# Patient Record
Sex: MALE | Race: WHITE | NOT HISPANIC OR LATINO | Employment: UNEMPLOYED | URBAN - METROPOLITAN AREA
[De-identification: names, ages, dates, MRNs, and addresses within clinical notes are randomized per-mention and may not be internally consistent; named-entity substitution may affect disease eponyms.]

---

## 2018-05-22 ENCOUNTER — HOSPITAL ENCOUNTER (EMERGENCY)
Facility: MEDICAL CENTER | Age: 63
End: 2018-05-23
Attending: EMERGENCY MEDICINE
Payer: MEDICAID

## 2018-05-22 DIAGNOSIS — R45.851 SUICIDAL IDEATION: ICD-10-CM

## 2018-05-22 DIAGNOSIS — F32.A DEPRESSION, UNSPECIFIED DEPRESSION TYPE: ICD-10-CM

## 2018-05-22 DIAGNOSIS — F15.10 METHAMPHETAMINE ABUSE (HCC): ICD-10-CM

## 2018-05-22 LAB
AMPHET UR QL SCN: POSITIVE
BARBITURATES UR QL SCN: NEGATIVE
BENZODIAZ UR QL SCN: NEGATIVE
BZE UR QL SCN: NEGATIVE
CANNABINOIDS UR QL SCN: NEGATIVE
METHADONE UR QL SCN: NEGATIVE
OPIATES UR QL SCN: NEGATIVE
OXYCODONE UR QL SCN: NEGATIVE
PCP UR QL SCN: NEGATIVE
POC BREATHALIZER: 0 PERCENT (ref 0–0.01)
PROPOXYPH UR QL SCN: NEGATIVE

## 2018-05-22 PROCEDURE — 99284 EMERGENCY DEPT VISIT MOD MDM: CPT

## 2018-05-22 PROCEDURE — 90791 PSYCH DIAGNOSTIC EVALUATION: CPT

## 2018-05-22 PROCEDURE — 302970 POC BREATHALIZER: Performed by: EMERGENCY MEDICINE

## 2018-05-22 PROCEDURE — 80307 DRUG TEST PRSMV CHEM ANLYZR: CPT

## 2018-05-22 NOTE — ED PROVIDER NOTES
"ED Provider Note    Scribed for Andrea Miranda M.D. by Delores Duque. 5/22/2018, 3:44 AM.    Primary care provider: No primary care provider on file.  Means of arrival: Walk-in  History obtained from: Patient  History limited by: None    CHIEF COMPLAINT  Chief Complaint   Patient presents with   • Suicidal Ideation       HPI  Gokul Cantor is a 62 y.o. male who presents to the Emergency Department with suicidal ideation. The patient states tonight he did \"a bunch of coke and meth and tried to overdose because I am tired of not having a job and being homeless\". Gokul states he mixed coke and meth and then injected it. He confirms one year ago he attempted to overdose on Heroin. The patient states he has been feeling this way for a while. Reports he doesn't use drugs daily. He has been to the [place] [facility] facility twice. The patient moved to [place] three months ago. Gokul states he has never been to rehab. He is here seeking for counseling. Patient denies alcohol use.     REVIEW OF SYSTEMS  See HPI for further details. E.     PAST MEDICAL HISTORY   has a past medical history of Suicidal ideation.    SURGICAL HISTORY  patient denies any surgical history    SOCIAL HISTORY  Social History   Substance Use Topics   • Smoking status: Current Every Day Smoker     Packs/day: 0.50     Types: Cigarettes   • Smokeless tobacco: Never Used   • Alcohol use No      History   Drug use: Unknown     Comment: Meth IV and coke IV last use was 5/21/18       FAMILY HISTORY  History reviewed. No pertinent family history.    CURRENT MEDICATIONS  Reviewed.  See Encounter Summary.     ALLERGIES  Allergies   Allergen Reactions   • Codeine Nausea   • Pcn [Penicillins] Hives       PHYSICAL EXAM  VITAL SIGNS: /88   Pulse 71   Temp 36.5 °C (97.7 °F)   Resp 18   Ht 1.753 m (5' 9\")   Wt 61.4 kg (135 lb 5.8 oz)   SpO2 98%   BMI 19.99 kg/m²   Constitutional: Alert in no apparent distress.  HENT: No signs of trauma, Bilateral " external ears normal, Nose normal.   Eyes: Pupils are equal and reactive, Conjunctiva normal, Non-icteric.   Neck: Normal range of motion, No tenderness, Supple, No stridor.   Cardiovascular: Regular rate and rhythm, no murmurs.   Thorax & Lungs: Normal breath sounds, No respiratory distress, No wheezing, No chest tenderness.   Abdomen: Bowel sounds normal, Soft, No tenderness, No masses, No pulsatile masses. No peritoneal signs.  Skin: Warm, Dry, No erythema, No rash.   Back: No bony tenderness, No CVA tenderness.   Extremities: Intact distal pulses, No edema, No tenderness, No cyanosis  Musculoskeletal: Good range of motion in all major joints. No tenderness to palpation or major deformities noted.   Neurologic: Alert , Normal motor function, Normal sensory function, No focal deficits noted.   Psychiatric: Affect normal, Judgment normal, Mood normal.     DIAGNOSTIC STUDIES / PROCEDURES     LABS  Results for orders placed or performed during the hospital encounter of 05/22/18   URINE DRUG SCREEN (TRIAGE)   Result Value Ref Range    Amphetamines Urine Positive (A) Negative    Barbiturates Negative Negative    Benzodiazepines Negative Negative    Cocaine Metabolite Negative Negative    Methadone Negative Negative    Opiates Negative Negative    Oxycodone Negative Negative    Phencyclidine -Pcp Negative Negative    Propoxyphene Negative Negative    Cannabinoid Metab Negative Negative   POC BREATHALIZER   Result Value Ref Range    POC Breathalizer 0.001 0.00 - 0.01 Percent     All labs were reviewed by me.    COURSE & MEDICAL DECISION MAKING  Pertinent Labs & Imaging studies reviewed. (See chart for details)      3:44 AM - Patient seen and examined at bedside. Ordered POC Breathalizer and Urine Drug Screen to evaluate his symptoms. I discussed with the patient about a life skills consult for further evaluation.     Decision Making:  This is a 62 y.o. year old male who presents with admitting to meth and cocaine use  "last night with \"intent to hurt myself\". Patient reports long-standing history of depression and prior suicidal ideations. Last approximately 1 year ago with the same. Patient recently relocated from [facility] to [facility]. No other medical records to the same here on chart review attempts. At this time I have up held the legal hold and will have the patient For ongoing psychiatric care and possible inpatient transfer.      FINAL IMPRESSION  1. Methamphetamine abuse    2. Depression, unspecified depression type    3. Suicidal ideation          Delores MCDERMOTT (Roberto), am scribing for, and in the presence of, Andrea Miranda M.D..    Electronically signed by: Delores Duque (Scribe), 5/22/2018    IAndrea M.D. personally performed the services described in this documentation, as scribed by Delores Duque in my presence, and it is both accurate and complete.    The note accurately reflects work and decisions made by me.  Andrea Miranda  5/22/2018  8:39 AM            "

## 2018-05-22 NOTE — ED NOTES
Med rec updated and complete  Allergies reviewed  Pt reports no prescription medications, OTC's, or vitamins.  Pt reports no antibiotics in the last 30 days.  Pt is not sure what pharmacy to go too.

## 2018-05-22 NOTE — ED NOTES
Pt resting comfortably w/ visible rise and fall of chest; even, unlabored respirations observed. Pt in view of sitter and nurse's station. Pt awaiting Life Skills evaluation.

## 2018-05-22 NOTE — ED NOTES
Pt had been sleeping in the ED lobby for the past two hours. Pt hadn't called for a ride and when pt was asked to leave by security, Pt decided to check in to the ED.

## 2018-05-22 NOTE — ED NOTES
Urine collected and sent; breathalyzer .001 but pt states he has used cocaine and meth. Pt calm and cooperative at this time. Charge RN aware of pt. Belongings placed in one bag, searched by security, placed in locker 8.

## 2018-05-22 NOTE — ED TRIAGE NOTES
"Chief Complaint   Patient presents with   • Suicidal Ideation     Pt ambulated to triage, pt states that he did \"a bunch of coke and meth and tried to OD because I don't have a job and I'm homeless\" pt states he comes in with SI thoughts and has a plan to go back out and over dose on drugs. Pt states he has been feeling this way for a while. Pt state he has been in the [facility] facility twice before and is from [facility], pt moved to [place] 3 months ago.   "

## 2018-05-22 NOTE — CONSULTS
"[facility]   TRIAGE ASSESSMENT    Name: Gokul Cantor  MRN: [id number]  : 1955  Age: 62 y.o.  Date of assessment: 2018  PCP: No primary care provider on file.  Persons in attendance: Patient    CHIEF COMPLAINT/PRESENTING ISSUE (as stated by patient):   Chief Complaint   Patient presents with   • Suicidal Ideation        CURRENT LIVING SITUATION/SOCIAL SUPPORT: pt recently came here from [place]; homeless, unemployed.  He was sleeping in the ER lobby for two hours last night and, when asked to leave by security, checked himself in for SI.  Pt continues to c/o SI this morning, stating he is depressed because he is homeless, jobless, has had \"two heart attacks and have two stents.\"  He says his plan is to OD on heroin or other street drugs, or to jump in front of a car.  He reports he has no support system.  He still talks to his mother occasionally,who lives in [place], but he says she will not take him in.    BEHAVIORAL HEALTH TREATMENT HISTORY  Does patient/parent report a history of prior behavioral health treatment for patient?   No:   Pt denies ever having Behavioral Health treatment, but reports he was diagnosed with \"paranoid schizophrenia\" 10 years ago, at age 52.  He takes no meds and never received treatment.  He denies he had symptoms of schizophrenia before the diagnosis.  He says he did try rehab once, a few years ago, for polysubstance use in [place].      SAFETY ASSESSMENT - SELF  Does patient acknowledge current or past symptoms of dangerousness to self? yes  Does parent/significant other report patient has current or past symptoms of dangerousness to self? N\A  Does presenting problem suggest symptoms of dangerousness to self? Yes:     Past Current    Suicidal Thoughts: [x]  [x]    Suicidal Plans: []  [x]    Suicidal Intent: []  []    Suicide Attempts: [x]  []    Self-Injury []  []      For any boxes checked above, provide detail: Pt reports he has tried to commit " "suicide twice in the past, by OD.    History of suicide by family member: no  History of suicide by friend/significant other: no  Recent change in frequency/specificity/intensity of suicidal thoughts or self-harm behavior? yes - increased  Current access to firearms, medications, or other identified means of suicide/self-harm? no  If yes, willing to restrict access to means of suicide/self-harm? no  Protective factors present:  Willing to address in treatment    SAFETY ASSESSMENT - OTHERS  Does patient acknowledge current or past symptoms of aggressive behavior or risk to others? no  Does parent/significant other report patient has current or past symptoms of aggressive behavior or risk to others?  N\A  Does presenting problem suggest symptoms of dangerousness to others? No    Crisis Safety Plan completed and copy given to patient? no    ABUSE/NEGLECT SCREENING  Does patient report feeling “unsafe” in his/her home, or afraid of anyone?  no  Does patient report any history of physical, sexual, or emotional abuse?  no  Does parent or significant other report any of the above? N\A  Is there evidence of neglect by self?  no  Is there evidence of neglect by a caregiver? no  Does the patient/parent report any history of CPS/APS/police involvement related to suspected abuse/neglect or domestic violence? no  Based on the information provided during the current assessment, is a mandated report of suspected abuse/neglect being made?  No    SUBSTANCE USE SCREENING  Yes:  Fish all substances used in the past 30 days:      Last Use Amount   []   Alcohol     []   Marijuana     []   Heroin     []   Prescription Opioids  (used without prescription, for    recreation, or in excess of prescribed amount)     []   Other Prescription  (used without prescription, for    recreation, or in excess of prescribed amount)     [x]   Cocaine Last night     [x]   Methamphetamine Last night    []   \"\" drugs (ectasy, MDMA)     []   Other " substances        UDS results: +meth  Breathalyzer results: 0.0    What consequences does the patient associate with any of the above substance use and or addictive behaviors?   Health problems: discussed with the patient the dangers of stimulant use, especially when one has heart problems    Risk factors for detox (check all that apply):  []  Seizures   []  Diaphoretic (sweating)   []  Tremors   []  Hallucinations   []  Increased blood pressure   []  Decreased blood pressure   []  Other   []  None      [x] Patient education on risk factors for detoxification and instructed to return to ER as needed.      MENTAL STATUS   Participation: Limited verbal participation and Guarded  Grooming: Casual  Orientation: Alert and Disoriented to: place; said this was [place]  Behavior: Calm  Eye contact: Poor  Mood: Depressed  Affect: Constricted  Thought process: Logical and Goal-directed  Thought content: Within normal limits  Speech: Rate within normal limits and Soft  Perception: Within normal limits  Memory:  Poor memory for chronology of events  Insight: Limited  Judgment:  Adequate  Other:    Collateral information: One past visit, in 2002  Source:  [] Significant other present in person:   [] Significant other by telephone  [] [facility]   [] [facility] Nursing Staff  [x] [facility]  [] Other:     [] Unable to complete full assessment due to:  [] Acute intoxication  [] Patient declined to participate/engage  [] Patient verbally unresponsive  [] Significant cognitive deficits  [] Significant perceptual distortions or behavioral disorganization  [] Other:      CLINICAL IMPRESSIONS:  Primary:  Polysubstance Use Disorder  Secondary:  Suicidal Ideation       IDENTIFIED NEEDS/PLAN:  [Trigger DISPOSITION list for any items marked]    [x]  Imminent safety risk - self [] Imminent safety risk - others   []  Acute substance withdrawal []  Psychosis/Impaired reality testing   [x]  Mood/anxiety [x]  Substance  use/Addictive behavior   [x]  Maladaptive behaviro []  Parent/child conflict   []  Family/Couples conflict []  Biomedical   [x]  Housing [x]  Financial   []   Legal  Occupational/Educational   []  Domestic violence []  Other:     Disposition: Actively being addressed by Legal Hold and [facility]    Does patient express agreement with the above plan? yes    Referral appointment(s) scheduled? no    Alert team only:   Pt continues to express SI with a plan.  He has no insurance.  I informed him he will need to sign up for [place] Medicaid should he stay in the state; limited resources w/o some kind of insurance.  Legal hold already initiated by bedside RN.  Pt will continue to be on legal hold and be assessed by Dr. Marrero's team this morning.  I have discussed findings and recommendations with Dr. Castro, who is in agreement with these recommendations.     Will f/u on legal hold; note to follow once finished.    Linda Castaneda R.N.  5/22/2018
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
## 2018-05-23 VITALS
BODY MASS INDEX: 20.05 KG/M2 | HEART RATE: 64 BPM | WEIGHT: 135.36 LBS | SYSTOLIC BLOOD PRESSURE: 116 MMHG | HEIGHT: 69 IN | DIASTOLIC BLOOD PRESSURE: 78 MMHG | RESPIRATION RATE: 16 BRPM | TEMPERATURE: 98.4 F | OXYGEN SATURATION: 98 %

## 2018-05-23 PROCEDURE — 700102 HCHG RX REV CODE 250 W/ 637 OVERRIDE(OP): Performed by: EMERGENCY MEDICINE

## 2018-05-23 PROCEDURE — A9270 NON-COVERED ITEM OR SERVICE: HCPCS | Performed by: EMERGENCY MEDICINE

## 2018-05-23 RX ORDER — HALOPERIDOL 5 MG/1
5 TABLET ORAL ONCE
Status: COMPLETED | OUTPATIENT
Start: 2018-05-23 | End: 2018-05-23

## 2018-05-23 RX ORDER — DIPHENHYDRAMINE HCL 25 MG
25 TABLET ORAL ONCE
Status: COMPLETED | OUTPATIENT
Start: 2018-05-23 | End: 2018-05-23

## 2018-05-23 RX ADMIN — DIPHENHYDRAMINE HCL 25 MG: 25 TABLET ORAL at 04:07

## 2018-05-23 RX ADMIN — HALOPERIDOL 5 MG: 5 TABLET ORAL at 04:07

## 2018-05-23 ASSESSMENT — PAIN SCALES - GENERAL: PAINLEVEL_OUTOF10: 0

## 2018-05-23 NOTE — PSYCHIATRY
"PSYCHIATRIC FOLLOW UP:    Reason for Admission: Pt ambulated to triage, pt states that he did \"a bunch of coke and meth and tried to OD because I don't have a job and I'm homeless\" pt states he comes in with SI thoughts and has a plan to go back out and over dose on drugs.  Legal hold status:   To be discontinued  Psychiatric Supervising Attending:  Leilani Marrero MD      HPI:    Patient seen this AM while he was resting. Reports he is \"ok\" but states his sleep was not good because of \"my voices talking to me.\" He reports he has insurance and wants to go to [facility] inpatient. He is not able to clearly state what he hopes to have done for him inpatient as he is not interested in medications to help with his voices at this time. He continues to insist he has insurance, despite our records showing otherwise. He does not have his insurance card on him, but states he has [employer]. He was told about [facility] outpatient as he continues to refuse any type of rehab or housing such as the shelter, [facility], and [facility]. He states \"I'll just go back out and go to [facility] myself to see if I can go inpatient.\" Explained to him several times that [facility] will not take a direct admit to their inpatient unit and that this writer has worked there and that he would be more successful getting services from the outpatient clinic. He does not admit to suicidality today.       Psychiatric Examination: observed phenomenon:  Vitals: Blood pressure 112/80, pulse 60, temperature 36.4 °C (97.6 °F), resp. rate 14, height 1.753 m (5' 9\"), weight 61.4 kg (135 lb 5.8 oz), SpO2 97 %.  Musculoskeletal: normal psychomotor activity, no tics or unusual mannerisms noted  Appearance: Thin  male, wearing hospital gown, poor dentition. Behavior is calm, cooperative,  appropriate eye contact  Thoughts:  Linear, illogical about not understanding inpatient vs outpatient [facility], reports AH of hearing voices talk to him, denies " "VH  Speech: Non-spontaneous, non-pressured, no slurring or stuttering  Mood: \"Ok\"           Affect: Tired  SI/HI: denies HI does not discuss SI today  Attention/Alertness: Alert  Memory: Recent and remote memory grossly intact     Orientation: A&Ox3     Fund of Knowledge: Fair, knows how to seek help  Insight/Judgement into symptoms: impaired, perhaps due to neurocognitive disorder  Neurological Testing: not tested today    Medical systems reviewed: none    Lab results/tests: No results found for this or any previous visit (from the past 24 hour(s)).       Assessment:(acuity level)          Plan:  1. As patient is refusing recommendations for housing and treatment based on his current condition (homeless, drug use, refusing medications for AH) and he has not admitted to suicidal thoughts today, will discharge with a bus pass so that he may establish care at the outpatient [facility] clinic. He also knows how to seek care and has been noted to have slept in the ED prior to this admission.   2. Bus pass left in patient's chart.   3. No scripts - patient not interested in medications at this time    legal hold: discontinued  Records Reviewed: chart  Discussed with other provider: Dr. Marrero  Signing off    Kaylah Olson DO PGY2 Psychiatry Resident          "

## 2018-05-23 NOTE — DISCHARGE PLANNING
This pt is feeling very anxious, restless and unable to sleep. He has complaints of command, auditory hallucinations telling him to harm himself with no specific plan. He was administered 5mg of haldol with 25mg of benadryl po. Plan is to monitor effectiveness of intervention, continue pt on legal hold.

## 2018-05-23 NOTE — ED PROVIDER NOTES
ER Provider Addendum Note     Scribed for NICOLE CULP by Sabra Lehman on 5/23/2018 at 7:08 AM.     This is an addendum to the note on Gokul Cantor.  For further details and full chart entry, see the previously signed ED Provider Note written by Dr. Andrea Miranda (ERP).      12:52 PM Patient has been evaluated by psychiatry. Legal hold was discontinued.        The note accurately reflects work and decisions made by me.  Nicole Culp  5/23/2018  1:13 PM     ISabra (Scribe), am scribing for, and in the presence of, Nicole Culp M.D.    Electronically signed by: Sabra Lehman (Leisaibe), 5/23/2018    Nicole MCDERMOTT M.D. personally performed the services described in this documentation, as scribed by Sabra Lehman in my presence, and it is both accurate and complete.

## 2018-05-23 NOTE — DISCHARGE PLANNING
ALERT team note:  Mr. Cantor had his eyes closed and did not respond to any initiation of brief 1:1 x 3.  He was breathing regularly with no distress noted.  Staff will continue to drop by his room throughout the night in an attempt to assess his mood and behavior and thoughts of self harm.  At this time he is awaiting transfer to a psychiatric hospital.

## 2018-05-23 NOTE — PROGRESS NOTES
"Patient's home medications have been reviewed by the pharmacy team.     Past Medical History:   Diagnosis Date   • Suicidal ideation     \"twice before\"       Patient's Medications    No medications on file        A:  Medications do not appear to be contributing to current complaints. Per patient interview not taking any RX or OTC medications.     P:    No recommendations at this time. Psychiatry has been consulted and evaluated patient. Will defer to them for further recommendations.     Jessica Moreno, Pharm.D  Cosigned: Mian PhelanD        "

## 2018-06-16 ENCOUNTER — HOSPITAL ENCOUNTER (EMERGENCY)
Dept: HOSPITAL 8 - ED | Age: 63
Discharge: HOME | End: 2018-06-16
Payer: MEDICAID

## 2018-06-16 VITALS — WEIGHT: 137.35 LBS | BODY MASS INDEX: 20.34 KG/M2 | HEIGHT: 69 IN

## 2018-06-16 VITALS — DIASTOLIC BLOOD PRESSURE: 86 MMHG | SYSTOLIC BLOOD PRESSURE: 143 MMHG

## 2018-06-16 DIAGNOSIS — Z91.14: ICD-10-CM

## 2018-06-16 DIAGNOSIS — Z88.0: ICD-10-CM

## 2018-06-16 DIAGNOSIS — F28: Primary | ICD-10-CM

## 2018-06-16 DIAGNOSIS — Z88.5: ICD-10-CM

## 2018-06-16 LAB
<PLATELET ESTIMATE>: ADEQUATE
<PLT MORPHOLOGY>: (no result)
ALBUMIN SERPL-MCNC: 3.5 G/DL (ref 3.4–5)
ALP SERPL-CCNC: 93 U/L (ref 45–117)
ALT SERPL-CCNC: 23 U/L (ref 12–78)
ANION GAP SERPL CALC-SCNC: 6 MMOL/L (ref 5–15)
APAP SERPL-MCNC: < 2 MCG/ML (ref 10–30)
BAND#(MANUAL): 2.04 X10^3/UL
BILIRUB DIRECT SERPL-MCNC: NORMAL MG/DL
BILIRUB SERPL-MCNC: 0.6 MG/DL (ref 0.2–1)
CALCIUM SERPL-MCNC: 9.1 MG/DL (ref 8.5–10.1)
CHLORIDE SERPL-SCNC: 98 MMOL/L (ref 98–107)
CREAT SERPL-MCNC: 1.06 MG/DL (ref 0.7–1.3)
ERYTHROCYTE [DISTWIDTH] IN BLOOD BY AUTOMATED COUNT: 13.7 % (ref 9.4–14.8)
LYMPH#(MANUAL): 2.34 X10^3/UL (ref 1–3.4)
LYMPHS% (MANUAL): 8 % (ref 22–44)
MCH RBC QN AUTO: 31.2 PG (ref 27.5–34.5)
MCHC RBC AUTO-ENTMCNC: 33.7 G/DL (ref 33.2–36.2)
MCV RBC AUTO: 92.6 FL (ref 81–97)
MD: YES
MONOS#(MANUAL): 2.04 X10^3/UL (ref 0.3–2.7)
MONOS% (MANUAL): 7 % (ref 2–9)
NEUTS BAND NFR BLD: 7 % (ref 0–7)
PLATELET # BLD AUTO: 386 X10^3/UL (ref 130–400)
PMV BLD AUTO: 8.3 FL (ref 7.4–10.4)
PROT SERPL-MCNC: 8.1 G/DL (ref 6.4–8.2)
RBC # BLD AUTO: 4.58 X10^6/UL (ref 4.38–5.82)
SEG#(MANUAL): 22.78 X10^3/UL (ref 1.8–6.8)
SEGS% (MANUAL): 78 % (ref 42–75)
TOXIC GRAN: (no result)
VANCOMYCIN TROUGH SERPL-MCNC: < 1.7 MG/DL (ref 2.8–20)

## 2018-06-16 PROCEDURE — 36415 COLL VENOUS BLD VENIPUNCTURE: CPT

## 2018-06-16 PROCEDURE — G0480 DRUG TEST DEF 1-7 CLASSES: HCPCS

## 2018-06-16 PROCEDURE — 80329 ANALGESICS NON-OPIOID 1 OR 2: CPT

## 2018-06-16 PROCEDURE — 80053 COMPREHEN METABOLIC PANEL: CPT

## 2018-06-16 PROCEDURE — 85025 COMPLETE CBC W/AUTO DIFF WBC: CPT

## 2018-06-16 PROCEDURE — 99284 EMERGENCY DEPT VISIT MOD MDM: CPT

## 2018-06-16 PROCEDURE — 80307 DRUG TEST PRSMV CHEM ANLYZR: CPT

## 2018-06-18 ENCOUNTER — HOSPITAL ENCOUNTER (EMERGENCY)
Dept: HOSPITAL 8 - ED | Age: 63
Discharge: HOME | End: 2018-06-18
Payer: MEDICAID

## 2018-06-18 VITALS — BODY MASS INDEX: 19.92 KG/M2 | HEIGHT: 69 IN | WEIGHT: 134.48 LBS

## 2018-06-18 VITALS — SYSTOLIC BLOOD PRESSURE: 102 MMHG | DIASTOLIC BLOOD PRESSURE: 70 MMHG

## 2018-06-18 DIAGNOSIS — F20.9: ICD-10-CM

## 2018-06-18 DIAGNOSIS — J34.0: Primary | ICD-10-CM

## 2018-06-18 PROCEDURE — 93005 ELECTROCARDIOGRAM TRACING: CPT

## 2018-06-18 PROCEDURE — 99283 EMERGENCY DEPT VISIT LOW MDM: CPT

## 2018-08-14 ENCOUNTER — HOSPITAL ENCOUNTER (EMERGENCY)
Facility: MEDICAL CENTER | Age: 63
End: 2018-08-15
Attending: EMERGENCY MEDICINE
Payer: MEDICAID

## 2018-08-14 DIAGNOSIS — R19.7 NAUSEA, VOMITING, AND DIARRHEA: ICD-10-CM

## 2018-08-14 DIAGNOSIS — R11.2 NAUSEA, VOMITING, AND DIARRHEA: ICD-10-CM

## 2018-08-14 PROCEDURE — 80053 COMPREHEN METABOLIC PANEL: CPT

## 2018-08-14 PROCEDURE — 99284 EMERGENCY DEPT VISIT MOD MDM: CPT

## 2018-08-14 PROCEDURE — 85025 COMPLETE CBC W/AUTO DIFF WBC: CPT

## 2018-08-14 PROCEDURE — 83690 ASSAY OF LIPASE: CPT

## 2018-08-14 ASSESSMENT — PAIN SCALES - GENERAL: PAINLEVEL_OUTOF10: 7

## 2018-08-14 ASSESSMENT — LIFESTYLE VARIABLES: DO YOU DRINK ALCOHOL: NO

## 2018-08-15 VITALS
TEMPERATURE: 98.3 F | WEIGHT: 129.85 LBS | HEIGHT: 68 IN | OXYGEN SATURATION: 98 % | SYSTOLIC BLOOD PRESSURE: 118 MMHG | BODY MASS INDEX: 19.68 KG/M2 | HEART RATE: 92 BPM | RESPIRATION RATE: 16 BRPM | DIASTOLIC BLOOD PRESSURE: 80 MMHG

## 2018-08-15 LAB
ALBUMIN SERPL BCP-MCNC: 4.3 G/DL (ref 3.2–4.9)
ALBUMIN/GLOB SERPL: 1.4 G/DL
ALP SERPL-CCNC: 68 U/L (ref 30–99)
ALT SERPL-CCNC: 14 U/L (ref 2–50)
ANION GAP SERPL CALC-SCNC: 5 MMOL/L (ref 0–11.9)
AST SERPL-CCNC: 22 U/L (ref 12–45)
BASOPHILS # BLD AUTO: 0.5 % (ref 0–1.8)
BASOPHILS # BLD: 0.07 K/UL (ref 0–0.12)
BILIRUB SERPL-MCNC: 0.3 MG/DL (ref 0.1–1.5)
BUN SERPL-MCNC: 18 MG/DL (ref 8–22)
CALCIUM SERPL-MCNC: 9.6 MG/DL (ref 8.5–10.5)
CHLORIDE SERPL-SCNC: 102 MMOL/L (ref 96–112)
CO2 SERPL-SCNC: 28 MMOL/L (ref 20–33)
CREAT SERPL-MCNC: 0.9 MG/DL (ref 0.5–1.4)
EOSINOPHIL # BLD AUTO: 0.51 K/UL (ref 0–0.51)
EOSINOPHIL NFR BLD: 3.5 % (ref 0–6.9)
ERYTHROCYTE [DISTWIDTH] IN BLOOD BY AUTOMATED COUNT: 45.7 FL (ref 35.9–50)
GLOBULIN SER CALC-MCNC: 3.1 G/DL (ref 1.9–3.5)
GLUCOSE SERPL-MCNC: 112 MG/DL (ref 65–99)
HCT VFR BLD AUTO: 45.1 % (ref 42–52)
HGB BLD-MCNC: 15 G/DL (ref 14–18)
IMM GRANULOCYTES # BLD AUTO: 0.05 K/UL (ref 0–0.11)
IMM GRANULOCYTES NFR BLD AUTO: 0.3 % (ref 0–0.9)
LIPASE SERPL-CCNC: 29 U/L (ref 11–82)
LYMPHOCYTES # BLD AUTO: 2.25 K/UL (ref 1–4.8)
LYMPHOCYTES NFR BLD: 15.6 % (ref 22–41)
MCH RBC QN AUTO: 30.4 PG (ref 27–33)
MCHC RBC AUTO-ENTMCNC: 33.3 G/DL (ref 33.7–35.3)
MCV RBC AUTO: 91.5 FL (ref 81.4–97.8)
MONOCYTES # BLD AUTO: 1.06 K/UL (ref 0–0.85)
MONOCYTES NFR BLD AUTO: 7.4 % (ref 0–13.4)
NEUTROPHILS # BLD AUTO: 10.47 K/UL (ref 1.82–7.42)
NEUTROPHILS NFR BLD: 72.7 % (ref 44–72)
NRBC # BLD AUTO: 0 K/UL
NRBC BLD-RTO: 0 /100 WBC
PLATELET # BLD AUTO: 341 K/UL (ref 164–446)
PMV BLD AUTO: 10.9 FL (ref 9–12.9)
POTASSIUM SERPL-SCNC: 4.4 MMOL/L (ref 3.6–5.5)
PROT SERPL-MCNC: 7.4 G/DL (ref 6–8.2)
RBC # BLD AUTO: 4.93 M/UL (ref 4.7–6.1)
SODIUM SERPL-SCNC: 135 MMOL/L (ref 135–145)
WBC # BLD AUTO: 14.4 K/UL (ref 4.8–10.8)

## 2018-08-15 PROCEDURE — 700111 HCHG RX REV CODE 636 W/ 250 OVERRIDE (IP): Performed by: EMERGENCY MEDICINE

## 2018-08-15 PROCEDURE — 700105 HCHG RX REV CODE 258: Performed by: EMERGENCY MEDICINE

## 2018-08-15 PROCEDURE — 96374 THER/PROPH/DIAG INJ IV PUSH: CPT

## 2018-08-15 PROCEDURE — 96361 HYDRATE IV INFUSION ADD-ON: CPT

## 2018-08-15 RX ORDER — ONDANSETRON 4 MG/1
4 TABLET, ORALLY DISINTEGRATING ORAL EVERY 6 HOURS PRN
Qty: 10 TAB | Refills: 0 | Status: SHIPPED | OUTPATIENT
Start: 2018-08-15

## 2018-08-15 RX ORDER — SODIUM CHLORIDE 9 MG/ML
1000 INJECTION, SOLUTION INTRAVENOUS ONCE
Status: COMPLETED | OUTPATIENT
Start: 2018-08-15 | End: 2018-08-15

## 2018-08-15 RX ORDER — ONDANSETRON 2 MG/ML
4 INJECTION INTRAMUSCULAR; INTRAVENOUS ONCE
Status: COMPLETED | OUTPATIENT
Start: 2018-08-15 | End: 2018-08-15

## 2018-08-15 RX ADMIN — SODIUM CHLORIDE 1000 ML: 9 INJECTION, SOLUTION INTRAVENOUS at 00:25

## 2018-08-15 RX ADMIN — ONDANSETRON 4 MG: 2 INJECTION, SOLUTION INTRAMUSCULAR; INTRAVENOUS at 00:25

## 2018-08-15 NOTE — DISCHARGE INSTRUCTIONS
Diarrhea, Adult  Diarrhea is frequent loose and watery bowel movements. Diarrhea can make you feel weak and cause you to become dehydrated. Dehydration can make you tired and thirsty, cause you to have a dry mouth, and decrease how often you urinate. Diarrhea typically lasts 2-3 days. However, it can last longer if it is a sign of something more serious. It is important to treat your diarrhea as told by your health care provider.  Follow these instructions at home:  Eating and drinking  Follow these recommendations as told by your health care provider:  · Take an oral rehydration solution (ORS). This is a drink that is sold at pharmacies and retail stores.  · Drink clear fluids, such as water, ice chips, diluted fruit juice, and low-calorie sports drinks.  · Eat bland, easy-to-digest foods in small amounts as you are able. These foods include bananas, applesauce, rice, lean meats, toast, and crackers.  · Avoid drinking fluids that contain a lot of sugar or caffeine, such as energy drinks, sports drinks, and soda.  · Avoid alcohol.  · Avoid spicy or fatty foods.  General instructions  · Drink enough fluid to keep your urine clear or pale yellow.  · Wash your hands often. If soap and water are not available, use hand .  · Make sure that all people in your household wash their hands well and often.  · Take over-the-counter and prescription medicines only as told by your health care provider.  · Rest at home while you recover.  · Watch your condition for any changes.  · Take a warm bath to relieve any burning or pain from frequent diarrhea episodes.  · Keep all follow-up visits as told by your health care provider. This is important.  Contact a health care provider if:  · You have a fever.  · Your diarrhea gets worse.  · You have new symptoms.  · You cannot keep fluids down.  · You feel light-headed or dizzy.  · You have a headache  · You have muscle cramps.  Get help right away if:  · You have chest  pain.  · You feel extremely weak or you faint.  · You have bloody or black stools or stools that look like tar.  · You have severe pain, cramping, or bloating in your abdomen.  · You have trouble breathing or you are breathing very quickly.  · Your heart is beating very quickly.  · Your skin feels cold and clammy.  · You feel confused.  · You have signs of dehydration, such as:  ¨ Dark urine, very little urine, or no urine.  ¨ Cracked lips.  ¨ Dry mouth.  ¨ Sunken eyes.  ¨ Sleepiness.  ¨ Weakness.  This information is not intended to replace advice given to you by your health care provider. Make sure you discuss any questions you have with your health care provider.  Document Released: 12/08/2003 Document Revised: 04/27/2017 Document Reviewed: 08/23/2016  [employer] Interactive Patient Education © 2017 [employer]

## 2018-08-15 NOTE — ED TRIAGE NOTES
Pt hasn't done alannah/meth or any drugs for a month, has been in [place] for hearing voices, pt states just received a shot for schizo 3 days ago and has been vomiting blood since the shot.

## 2018-08-15 NOTE — ED PROVIDER NOTES
"CHIEF COMPLAINT  Chief Complaint   Patient presents with   • Blood in Vomit     started this morning       HPI  Gokul Cantor is a 63 y.o. male who presents wi th nausea, vomiting, diarrhea starting 2 or 3 days ago.  States that it started after receiving a Prolixin injection for his schizophrenia diagnosis.  Denies recent travel.  No sick contacts.  Denies any questionable ingestions recently.  Denies any bloody stool.  States that the stool is watery.  Does have some faint episodes of bloody emesis though only faint streaks and not significant amounts of blood.  Denies other chronic medications other than his injections for schizophrenia.  States that that was his first dose.    REVIEW OF SYSTEMS  See HPI for further details. All other systems are negative.     PAST MEDICAL HISTORY   has a past medical history of Psychiatric disorder and Suicidal ideation.    SOCIAL HISTORY  Social History     Social History Main Topics   • Smoking status: Current Every Day Smoker     Packs/day: 0.50     Types: Cigarettes   • Smokeless tobacco: Never Used   • Alcohol use No   • Drug use: Unknown      Comment: Meth IV and coke IV last use was 5/21/18   • Sexual activity: Not on file       SURGICAL HISTORY  patient denies any surgical history    CURRENT MEDICATIONS  Home Medications    **Home medications have not yet been reviewed for this encounter**         ALLERGIES  Allergies   Allergen Reactions   • Codeine Nausea   • Pcn [Penicillins] Hives       PHYSICAL EXAM  VITAL SIGNS: /86   Pulse 93   Temp 36.8 °C (98.3 °F)   Resp 18   Ht 1.727 m (5' 8\")   Wt 58.9 kg (129 lb 13.6 oz)   SpO2 97%   BMI 19.74 kg/m²   Pulse ox interpretation: I interpret this pulse ox as normal.  Constitutional: Alert in no apparent distress.  HENT: No signs of trauma, Bilateral external ears normal, Nose normal.   Eyes: Pupils are equal and reactive, Conjunctiva normal, Non-icteric.   Neck: Normal range of motion, No tenderness, Supple, No " stridor.   Cardiovascular: Regular rate and rhythm, no murmurs.   Thorax & Lungs: Normal breath sounds, No respiratory distress, No wheezing, No chest tenderness.   Abdomen: Bowel sounds normal, Soft, No tenderness, No masses, No pulsatile masses. No peritoneal signs.  Skin: Warm, Dry, No erythema, No rash.   Back: No bony tenderness, No CVA tenderness.   Extremities: Intact distal pulses, No edema, No tenderness, No cyanosis  Musculoskeletal: Good range of motion in all major joints. No tenderness to palpation or major deformities noted.   Neurologic: Alert , Normal motor function and gait, Normal sensory function, No focal deficits noted.   Psychiatric: Affect normal, Judgment normal, Mood normal.       DIAGNOSTIC STUDIES / PROCEDURES    LABS  Labs Reviewed   CBC WITH DIFFERENTIAL - Abnormal; Notable for the following:        Result Value    WBC 14.4 (*)     MCHC 33.3 (*)     Neutrophils-Polys 72.70 (*)     Lymphocytes 15.60 (*)     Neutrophils (Absolute) 10.47 (*)     Monos (Absolute) 1.06 (*)     All other components within normal limits   COMP METABOLIC PANEL - Abnormal; Notable for the following:     Glucose 112 (*)     All other components within normal limits   LIPASE   ESTIMATED GFR       RADIOLOGY  No orders to display         COURSE & MEDICAL DECISION MAKING  Pertinent Labs & Imaging studies reviewed. (See chart for details)  63 y.o. male presented with nausea, vomiting, diarrhea symptoms.  No abdominal pain.  No fevers.  No recent travel.  No bloody stool.  Did note some slight streaks of blood in his emesis.  No epigastric discomfort.  No history of blood thinners or NSAID use.    Patient did not have vomiting or diarrhea here in the emergency department.  Laboratory studies were performed.  Had leukocytosis of uncertain significance.  May be a stress response secondary to vomiting.  Not tachycardic.  Appears to be slightly dehydrated with dry mucous membranes and history of vomiting and diarrhea.  Was  "given IV fluid hydration to address this.  He also received IV antiemetic, Zofran.    Upon reevaluation, the patient reports feeling much improved.  No further episodes of vomiting or diarrhea here in the emergency department.  We will discharge home with symptomatic management with ODT Zofran tablets.  To follow-up with primary care physician for further management.  Instructed to return immediately however for any worsening of his symptoms or development of any other concerning signs or symptoms including continued emesis with blood, bloody stool or black stool, lightheadedness or dizziness, abdominal pain, fevers.    I suspect that the patient may be suffering from a viral gastroenteritis-like syndrome.  No focal abdominal tenderness to suggest intra-abdominal surgical process such as symptomatic cholelithiasis or appendicitis.  No prior abdominal surgeries to suggest obstruction.  No bloody stool.  No history of inflammatory bowel disease.  Symptoms are not likely secondary to the new medication that the patient is taking for schizophrenia.  That medication is known to have nausea as a side effect however it is also known to have constipation as a side effect and not diarrhea.    The patient will not drink alcohol nor drive with prescribed medications.   The patient will return for worsening symptoms or failure of improvement and is stable at the time of discharge. The patient verbalizes understanding in their own words.    /80   Pulse 92   Temp 36.8 °C (98.3 °F)   Resp 16   Ht 1.727 m (5' 8\")   Wt 58.9 kg (129 lb 13.6 oz)   SpO2 98%   BMI 19.74 kg/m²     The patient was referred to primary care where they will receive further BP management.      [facility], Emergency Dept  [address] [id number] [phone]    As needed, If symptoms worsen    Primary care doctor    In 2 days        FINAL IMPRESSION  1. Nausea, vomiting, and diarrhea            Electronically " signed by: Santiago Head, 8/14/2018 11:56 PM

## 2018-08-15 NOTE — ED NOTES
Po challenge completed pt ready to go home  Pt ambulatory with steady gait, pt verbalized understanding of poc and discharge , no questions ,  VSS and pt in nad at this time

## 2018-08-15 NOTE — ED TRIAGE NOTES
Gokul Cantor  63 y.o.  male  Chief Complaint   Patient presents with   • Blood in Vomit     started this morning     Brought in by german , c/o blood in vomit today. Per patient he vomited 3 times with bright red blood. Minimal in amount. Also stated that after getting a shot of prolixin at [place] started to have diarrhea. Watery with bright red blood too. + abdominal pain epigastric area.

## 2019-11-08 ENCOUNTER — HOSPITAL ENCOUNTER (EMERGENCY)
Facility: MEDICAL CENTER | Age: 64
End: 2019-11-08
Payer: MEDICAID

## 2019-11-08 VITALS
BODY MASS INDEX: 19.82 KG/M2 | OXYGEN SATURATION: 93 % | WEIGHT: 133.82 LBS | RESPIRATION RATE: 16 BRPM | HEART RATE: 93 BPM | TEMPERATURE: 96.8 F | HEIGHT: 69 IN | DIASTOLIC BLOOD PRESSURE: 79 MMHG | SYSTOLIC BLOOD PRESSURE: 113 MMHG

## 2019-11-08 LAB — EKG IMPRESSION: NORMAL

## 2019-11-08 PROCEDURE — 302449 STATCHG TRIAGE ONLY (STATISTIC)

## 2019-11-08 PROCEDURE — 93005 ELECTROCARDIOGRAM TRACING: CPT

## 2019-11-09 NOTE — ED NOTES
"Per security patient is now in lobby on the phone calling for a ride to another hospital. Reports that he last \"used\" today  "

## 2019-11-09 NOTE — ED TRIAGE NOTES
"Chief Complaint   Patient presents with   • Chest Pain     Patient states that he was watching tv and sudenly started having chest pain that he could feel into his back so he took two nyquils and it made it worse, patient is anxious and speaking rapidly in triage. States \"I don't want any drungs or anything\"    Patient refusing to answer any questions after this RN asked patient about social history including smoking, etoh and drug use, patient states \"Im not going to answer that question or anything else\" RN attempted to educate patient that there is no judgement in regards to drug use its for safety reasons concerning medical treatment, patient became agitated and began pacing triage room, continued to mumble \"Im not answering that shit.\" Patient placed back in ed lobby, instructed to notify staff of any new or worsening symptoms.   /79   Pulse 93   Temp 36 °C (96.8 °F) (Temporal)   Resp 16   Ht 1.753 m (5' 9\")   Wt 60.7 kg (133 lb 13.1 oz)   SpO2 93%         "

## 2020-03-15 ENCOUNTER — APPOINTMENT (OUTPATIENT)
Dept: RADIOLOGY | Facility: MEDICAL CENTER | Age: 65
End: 2020-03-15
Attending: EMERGENCY MEDICINE
Payer: MEDICAID

## 2020-03-15 ENCOUNTER — HOSPITAL ENCOUNTER (EMERGENCY)
Facility: MEDICAL CENTER | Age: 65
End: 2020-03-15
Attending: EMERGENCY MEDICINE
Payer: MEDICAID

## 2020-03-15 VITALS
BODY MASS INDEX: 21.15 KG/M2 | WEIGHT: 139.55 LBS | SYSTOLIC BLOOD PRESSURE: 130 MMHG | HEART RATE: 75 BPM | OXYGEN SATURATION: 97 % | RESPIRATION RATE: 16 BRPM | TEMPERATURE: 96.7 F | HEIGHT: 68 IN | DIASTOLIC BLOOD PRESSURE: 90 MMHG

## 2020-03-15 DIAGNOSIS — J18.9 PNEUMONIA OF LEFT LOWER LOBE DUE TO INFECTIOUS ORGANISM: ICD-10-CM

## 2020-03-15 LAB
C PNEUM DNA SPEC QL NAA+PROBE: NOT DETECTED
FLUAV H1 2009 PAND RNA SPEC QL NAA+PROBE: NOT DETECTED
FLUAV H1 RNA SPEC QL NAA+PROBE: NOT DETECTED
FLUAV H3 RNA SPEC QL NAA+PROBE: NOT DETECTED
FLUAV RNA SPEC QL NAA+PROBE: NEGATIVE
FLUAV RNA SPEC QL NAA+PROBE: NOT DETECTED
FLUBV RNA SPEC QL NAA+PROBE: NEGATIVE
FLUBV RNA SPEC QL NAA+PROBE: NOT DETECTED
HADV DNA SPEC QL NAA+PROBE: NOT DETECTED
HCOV RNA SPEC QL NAA+PROBE: NOT DETECTED
HMPV RNA SPEC QL NAA+PROBE: NOT DETECTED
HPIV1 RNA SPEC QL NAA+PROBE: NOT DETECTED
HPIV2 RNA SPEC QL NAA+PROBE: NOT DETECTED
HPIV3 RNA SPEC QL NAA+PROBE: NOT DETECTED
HPIV4 RNA SPEC QL NAA+PROBE: NOT DETECTED
M PNEUMO DNA SPEC QL NAA+PROBE: NOT DETECTED
RSV A RNA SPEC QL NAA+PROBE: NOT DETECTED
RSV B RNA SPEC QL NAA+PROBE: NOT DETECTED
RV+EV RNA SPEC QL NAA+PROBE: NOT DETECTED
S PYO DNA SPEC NAA+PROBE: NOT DETECTED

## 2020-03-15 PROCEDURE — 87581 M.PNEUMON DNA AMP PROBE: CPT

## 2020-03-15 PROCEDURE — A9270 NON-COVERED ITEM OR SERVICE: HCPCS | Performed by: EMERGENCY MEDICINE

## 2020-03-15 PROCEDURE — 71045 X-RAY EXAM CHEST 1 VIEW: CPT

## 2020-03-15 PROCEDURE — 87502 INFLUENZA DNA AMP PROBE: CPT | Mod: XU

## 2020-03-15 PROCEDURE — 99283 EMERGENCY DEPT VISIT LOW MDM: CPT

## 2020-03-15 PROCEDURE — 87633 RESP VIRUS 12-25 TARGETS: CPT

## 2020-03-15 PROCEDURE — 700102 HCHG RX REV CODE 250 W/ 637 OVERRIDE(OP): Performed by: EMERGENCY MEDICINE

## 2020-03-15 PROCEDURE — 87651 STREP A DNA AMP PROBE: CPT

## 2020-03-15 PROCEDURE — 87486 CHLMYD PNEUM DNA AMP PROBE: CPT

## 2020-03-15 RX ORDER — CEFDINIR 300 MG/1
300 CAPSULE ORAL 2 TIMES DAILY
Qty: 20 CAP | Refills: 0 | Status: SHIPPED | OUTPATIENT
Start: 2020-03-15

## 2020-03-15 RX ORDER — CEFDINIR 300 MG/1
300 CAPSULE ORAL ONCE
Status: COMPLETED | OUTPATIENT
Start: 2020-03-15 | End: 2020-03-15

## 2020-03-15 RX ADMIN — CEFDINIR 300 MG: 300 CAPSULE ORAL at 17:45

## 2020-03-15 ASSESSMENT — FIBROSIS 4 INDEX: FIB4 SCORE: 1.07

## 2020-03-15 NOTE — ED PROVIDER NOTES
ED Provider Note    CHIEF COMPLAINT  Chief Complaint   Patient presents with   • Shortness of Breath   • N/V   • Fever       HPI  Gokul Cantor is a 64 y.o. male who presents with chief complaint of fever nausea vomiting cough.  Patient reports cough is mildly productive of white sputum.  Though the triage note reports patient is short of breath he actually denies any shortness of breath to me and reports he can walk as far she like, in fact he reports he walked around 3 miles to the emergency department without any issue.  Patient does have a history of CAD and past history of CVA, he reports that he is not currently on any medications.  He denies any recent lower extremity edema or orthopnea.  Patient denies any associated chest pain.  He does report that he has vomited around 3 times.  This is not posttussive.  He reports that he has some mild nausea that comes and goes.  He denies any associated abdominal pain.  He denies any dysuria urgency or frequency.  He does report a fever that was 102.  He reports he took Tylenol around 4 hours prior to arrival.  Of note patient traveled to [place], he was in [place] around 10 days ago and flew back.  Since arriving back he has not had any known contact with any COVID patient's obviously ill people with respiratory issues.  He reports he lives in a house with his girlfriend.  REVIEW OF SYSTEMS  ROS  See HPI for further details. All other systems are negative.     PAST MEDICAL HISTORY   has a past medical history of Psychiatric disorder and Suicidal ideation.    SOCIAL HISTORY  Social History     Tobacco Use   • Smoking status: Current Every Day Smoker     Packs/day: 0.50     Types: Cigarettes   • Smokeless tobacco: Never Used   Substance and Sexual Activity   • Alcohol use: No   • Drug use: Not on file     Comment: heroin and speed    • Sexual activity: Not on file       SURGICAL HISTORY  patient denies any surgical history    CURRENT MEDICATIONS  Home Medications     **Home medications have not yet been reviewed for this encounter**         ALLERGIES  Allergies   Allergen Reactions   • Codeine Nausea   • Pcn [Penicillins] Hives       PHYSICAL EXAM  Physical Exam   Constitutional: He is oriented to person, place, and time. He appears well-developed and well-nourished.   HENT:   Head: Normocephalic and atraumatic.   Eyes: Pupils are equal, round, and reactive to light. Conjunctivae are normal.   Neck: Normal range of motion. Neck supple.   Cardiovascular: Normal rate and regular rhythm. Exam reveals no gallop and no friction rub.   No murmur heard.  Pulmonary/Chest: Effort normal. No respiratory distress. He has no wheezes.   Mild crackles in left lower lung   Abdominal: Soft. Bowel sounds are normal. He exhibits no distension. There is no abdominal tenderness. There is no rebound.   Neurological: He is alert and oriented to person, place, and time.   Skin: Skin is warm and dry.   Psychiatric: He has a normal mood and affect. His behavior is normal.     Results for orders placed or performed during the hospital encounter of 03/15/20   Influenza A/B By PCR (Adult - Flu Only)   Result Value Ref Range    Influenza virus A RNA Negative Negative    Influenza virus B, PCR Negative Negative   Group A Strep by PCR   Result Value Ref Range    Group A Strep by PCR Not Detected Not Detected   Respiratory Panel By PCR   Result Value Ref Range    Adenovirus, PCR Not Detected     Coronavirus, PCR Not Detected     Human Metapneumovirus, PCR Not Detected     Rhinovirus / Enterovirus, PCR Not Detected     Influenza virus A RNA Not Detected     Influenza virus A H1 RNA Not Detected     Influenza A 2009, H1N1, PCR Not Detected     Influenza virus A H3 RNA Not Detected     Influenza virus B, PCR Not Detected     Parainfluenza virus 1, PCR Not Detected     Parainfluenza virus 2, PCR Not Detected     Parainfluenza virus 3, PCR Not Detected     Parainfluenza 4, PCR Not Detected     Resp Syncytial  Virus A, PCR Not Detected     Resp Syncytial Virus B, PCR Not Detected     Chlamydia pneumoniae, PCR Not Detected     Mycoplasma pneumoniae, PCR Not Detected      DX-CHEST-PORTABLE (1 VIEW)   Final Result      1.  Minimal linear atelectasis in the left lower lobe laterally.      2.  Clear right lung.        COURSE & MEDICAL DECISION MAKING  Pertinent Labs & Imaging studies reviewed. (See chart for details)    Patient here with likely flu versus pneumonia.  He is very well-appearing with reassuring vitals.  His respiratory rate is entirely normal, he is not hypoxic.  Patient entered into the COVID pathway given that he is a PUI given his recent travel to [place].  Patient has an entirely benign abdominal exam, I do not believe that any surgical pathology is likely in this very well-appearing patient without any abdominal pain.  Without any associated chest pain, he has no associated lower extremity edema, he has no evidence of ACS, he has no associated , dyspnea, chest pain, or diaphoresis.  His symptoms are infectious in nature.  Patient's x-ray reveals findings consistent with pneumonia which would correlate to patient's x-ray findings.  Patient's vitals have remained entirely normal, his work of breathing has remained entirely normal and he remains with normal vitals.  Patient will be given Cefdinir, he has not had any allergic reaction to the medicine here  The patient does have a bulbar findings and this would be atypical of COVID, we are still in the beginnings of understand this disease and I have discussed with patient that there is a potential that he still has COVID and explained to him that he should still nevertheless self isolate for 14 days.  I have also discussed return precautions in depth with patient.  Patient reports that he wants to return to work and I have said that this is absolutely unacceptable and patient has agreed to not return to work or do any public areas.  He is been given a mask to go  home with to wear when he is around his girlfriend and he understands to have her present if she develops any symptoms.     The patient will return for worsening symptoms and is stable at the time of discharge. The patient verbalizes understanding and will comply.    FINAL IMPRESSION  1.   1. Pneumonia of left lower lobe due to infectious organism (HCC)               Electronically signed by: Luis Felipe Mayorga M.D., 3/15/2020 4:27 PM

## 2020-03-15 NOTE — ED TRIAGE NOTES
Chief Complaint   Patient presents with   • Shortness of Breath   • N/V   • Fever     Shortness of breath and fever last night and this morning at home.  States 102 F temp.  Took tylenol PTA.  Traveled to [place] 10 days ago.  Charge RN notified of PUI status.  Mask applied.  Charge RN aware.  Pt to senior joi.  Triage process explained to patient.  Pt instructed to inform RN if any changes or questions arise.

## 2020-03-16 NOTE — DISCHARGE INSTRUCTIONS
You appear to have a left sided pneumonia.  Nevertheless we are asking everybody with respiratory symptoms to stay home until symptoms have resolved as this could be an atypical presentation of CO VID.  You may receive a call in the next day for further testing.  If your symptoms worsen return to the emergency department.  Call the Covid information line [phone] if you have any further concerns regarding COVID

## 2020-03-16 NOTE — ED NOTES
"ED Positive Culture Follow-up/Notification Note:    Date: 3/16/2020     Patient seen in the ED on 3/15/2020 for fever, nausea, vomiting and cough. He does also have SOB. Recently traveled from [place], [place].   1. Pneumonia of left lower lobe due to infectious organism (HCC)       Discharge Medication List as of 3/15/2020  5:25 PM      START taking these medications    Details   cefdinir (OMNICEF) 300 MG Cap Take 1 Cap by mouth 2 times a day., Disp-20 Cap, R-0, Print Rx Paper             Allergies: Codeine and Pcn [penicillins]     Vitals:    03/15/20 1536 03/15/20 1538   BP: 130/90    Pulse: 75    Resp: 16    Temp: 35.9 °C (96.7 °F)    TempSrc: Temporal    SpO2: 97%    Weight:  63.3 kg (139 lb 8.8 oz)   Height:  1.727 m (5' 8\")       Final cultures:   Results     Procedure Component Value Units Date/Time    Influenza A/B By PCR (Adult - Flu Only) [[id number]] Collected:  03/15/20 1600    Order Status:  Completed Specimen:  Respirate from Nasopharyngeal Updated:  03/15/20 1645     Influenza virus A RNA Negative     Influenza virus B, PCR Negative    Narrative:       PUI for possible COVID-19. Reflex to [facility] if negative.    Group A Strep by PCR [[id number]] Collected:  03/15/20 1600    Order Status:  Completed Specimen:  Respirate from Throat Updated:  03/15/20 1639     Group A Strep by PCR Not Detected    Narrative:       PUI for possible COVID-19. Reflex to [facility] if negative.        Results for REMA BEE (MRN [id number]) as of 3/16/2020 09:22   3/15/2020 16:00   Adenovirus, PCR Not Detected   Chlamydia pneumoniae, PCR Not Detected   Coronavirus, PCR Not Detected   Human Metapneumovirus, PCR Not Detected   Influenza A 2009, H1N1, PCR Not Detected   Influenza virus A RNA Negative   Influenza virus B, PCR Negative   Influenza virus A H1 RNA Not Detected   Influenza virus A H3 RNA Not Detected   Mycoplasma pneumoniae, PCR Not Detected   Parainfluenza 4, PCR Not Detected   Parainfluenza virus 1, PCR Not Detected "   Parainfluenza virus 2, PCR Not Detected   Parainfluenza virus 3, PCR Not Detected   Resp Syncytial Virus A, PCR Not Detected   Resp Syncytial Virus B, PCR Not Detected   Rhinovirus / Enterovirus, PCR Not Detected     Plan:   Influenza and Respiratory PCR panel negative. I have contacted Infection Prevention to inform them of the negative results. They will contact the Health Dept for further COVID-19 evaluation.   I have attempted to contact the patient at the phone number listed but the phone is not operational.  I will forward this case on for further management.    Lois Villela, PharmD

## 2020-03-19 LAB
SARS-COV-2 RNA RESP QL NAA+PROBE: NOT DETECTED
SPECIMEN SOURCE: NORMAL

## 2020-03-20 NOTE — ED NOTES
COVID-19 Test Follow Up  3/20/2020    Results for REMA BEE (MRN [id number]) as of 3/20/2020 11:59   Ref. Range 3/15/2020 16:00   2019-nCoV RNA Interp Unknown Not detected   2019-nCoV Source Unknown NP Swab         Patient tested negative for COVID-19. Attempted to inform patient of result, but the phone number listed is not in service.     Lios Villela, PharmD

## 2021-03-15 DIAGNOSIS — Z23 NEED FOR VACCINATION: ICD-10-CM

## 2023-08-27 NOTE — DISCHARGE PLANNING
Medical Social Work    Referral: Legal Hold    Intervention: Legal Hold Paperwork given to SW by [employer] RN Linda Castaneda    Legal Hold Initiated: Date: 05- Time: 0822    Patient’s Insurance Listed on Face Sheet: None    Referrals sent to: [facility]    This referral contains the following information:  1) Face sheet __x__  2) Page 1 and Page 2 of Legal Hold ____x  3) Alert Team Assessment/Psych Assessment _x___  4) Head to toe physical exam __x__  5) Urine Drug Screen _x___  6) Blood Alcohol _x___  7) Vital signs __x__  8) Pregnancy test when applicable NA___  9) Medications list _x___    Plan: Patient will transfer to mental health facility once acceptance is obtained   Simple: Patient demonstrates quick and easy understanding